# Patient Record
(demographics unavailable — no encounter records)

---

## 2024-12-03 NOTE — HISTORY OF PRESENT ILLNESS
[FreeTextEntry8] : pt presents with left shoulder pain, uncomfortable sleeping, seems to have good range of motion, started 3 weeks ago. Denies muscle weakness.

## 2024-12-03 NOTE — PHYSICAL EXAM
[No Acute Distress] : no acute distress [Grossly Normal Strength/Tone] : grossly normal strength/tone [Normal Gait] : normal gait [Alert and Oriented x3] : oriented to person, place, and time

## 2024-12-03 NOTE — REVIEW OF SYSTEMS
[Joint Pain] : joint pain [Joint Stiffness] : joint stiffness [Back Pain] : no back pain [Joint Swelling] : no joint swelling [Negative] : Heme/Lymph

## 2025-01-23 NOTE — PLAN
[FreeTextEntry1] : Patient presents for URI symptoms for the past 2 days.  Likely viral brochitis Rapid flu negative.  Will do viral panel Advised hydration, rest, nasal sprays, mucinex prn. Will renew inhalers Advised to start medrol steroid taper if chest symptoms worsen

## 2025-01-23 NOTE — HISTORY OF PRESENT ILLNESS
[FreeTextEntry8] : Patient presents sinus pressure, dry cough, PND, chest pressure for 2 days.  Denies fevers, chills, sore throat.  His kids and wife have been sick at home.

## 2025-01-23 NOTE — PHYSICAL EXAM
[No Acute Distress] : no acute distress [Well Nourished] : well nourished [Well Developed] : well developed [Well-Appearing] : well-appearing [Normal Sclera/Conjunctiva] : normal sclera/conjunctiva [Normal Outer Ear/Nose] : the outer ears and nose were normal in appearance [No JVD] : no jugular venous distention [No Respiratory Distress] : no respiratory distress  [No Accessory Muscle Use] : no accessory muscle use [Normal Rate] : normal rate  [Regular Rhythm] : with a regular rhythm [Normal S1, S2] : normal S1 and S2 [No Murmur] : no murmur heard [No Edema] : there was no peripheral edema [Normal Gait] : normal gait [Normal Affect] : the affect was normal [Normal Insight/Judgement] : insight and judgment were intact [de-identified] : mild end exp rhonchi in lower lungs b/l

## 2025-02-03 NOTE — REVIEW OF SYSTEMS
[Discharge] : discharge [Redness] : redness [Nasal Discharge] : nasal discharge [Sore Throat] : sore throat [Negative] : Heme/Lymph [Cough] : no cough

## 2025-02-03 NOTE — HISTORY OF PRESENT ILLNESS
[FreeTextEntry8] : Patient presents for f/u and new symptoms. Was here last week for bronchitis.  Was given an inhaler and nasal sprays.  Still having nasal congestion and drip.  Also has pink eye now for the past day and started taking Moxifloxacin.  Has been having pus discharge from both eyes.   Throat is also hurting him.

## 2025-02-03 NOTE — PHYSICAL EXAM
[No Acute Distress] : no acute distress [Well Nourished] : well nourished [Well Developed] : well developed [Well-Appearing] : well-appearing [Normal Outer Ear/Nose] : the outer ears and nose were normal in appearance [No JVD] : no jugular venous distention [No Respiratory Distress] : no respiratory distress  [No Accessory Muscle Use] : no accessory muscle use [Normal Rate] : normal rate  [Regular Rhythm] : with a regular rhythm [Normal S1, S2] : normal S1 and S2 [No Murmur] : no murmur heard [No Edema] : there was no peripheral edema [Normal Gait] : normal gait [Normal Affect] : the affect was normal [Normal Insight/Judgement] : insight and judgment were intact [de-identified] : erythema and pus discharge b/l.  [de-identified] : mild throat erythema [de-identified] : Mild rhonchi and wheezing b/l

## 2025-05-24 NOTE — PHYSICAL EXAM
[Normal Rate] : the respiratory rate was normal [Normal Rhythm/Effort] : normal respiratory rhythm and effort [Barky Cough] : a barky cough was heard [Rhonchi Right] : rhonchi were heard diffusely over the right lung [Normal] : no carotid or abdominal bruits heard, no varicosities, pedal pulses are present, no peripheral edema, no extremity clubbing or cyanosis and no palpable aorta [de-identified] : frequent cough

## 2025-05-24 NOTE — HISTORY OF PRESENT ILLNESS
[Moderate] : moderate [___ Days ago] : [unfilled] days ago [Constant] : constant [Congestion] : congestion [Cough] : cough [Rest] : rest [At Night] : at night [In Morning] : in the morning [Worsening] : worsening [FreeTextEntry2] : productive, thick green [FreeTextEntry8] : patient is complaining of possible bronchitis, coughing up green mucus, body aches that have gotten better, head congestion X 2 days

## 2025-05-24 NOTE — PLAN
[FreeTextEntry1] : Recommend treatment focusing on decreasing severity of symptoms including salt gargles, over the counter Cepacol lozenges as needed for sore throat, over the counter saline nasal spray as needed for congestion. No antibiotics at this time. Wash hands to prevent the spread of infection, drink plenty of fluids, get appropriate rest, and maintain adequate nutrition. Follow up in 4-5 days if symptoms worsen or if symptoms persists beyond 14 days. Cough my persist for up to 3 weeks after other symptoms resolve. Advised patient to refer to emergency room if temperature 104 or greater, confusion, severe headache, neck pain, stiffness, inability to swallow, difficulty breathing, drooling, chest pain, abdominal pain, vomiting or shortness of breath.

## 2025-06-02 NOTE — HISTORY OF PRESENT ILLNESS
[FreeTextEntry8] : Patient presents for ongoing cough for two weeks. he was given Augmentin initially and cough persisted.  He was then started on Doxy.  CXR negative least week.  He finished augmentin, but is still on Doxy.  Cough is mostly clear but occasionally has green sputum production.  Tried inhalers, cough medicines.   His kids were sick recently, but they recovered after a few days.

## 2025-06-02 NOTE — PLAN
[FreeTextEntry1] : Patient presents for a persistent cough.  Likely has bronchitis.  Low clinical suspicion of PNA.   Unclear if viral vs bacterial. S/p treatment with augmentin C/w Doxycycline 100mg bid until finished.  May start medrol steroid taper. Renewed cough meds. If persistent, may start levaquin after Doxy is finished.  Advised side effects of Levaquin including tendonitis and tendon rupture.  Advised no strenuous exercise while on Levaquin and for 3-4 weeks after course is finished. Will test for Whooping cough via blood work and swab.  Will also do viral panel.

## 2025-06-02 NOTE — PHYSICAL EXAM
[Ill-Appearing] : ill-appearing [Normal Sclera/Conjunctiva] : normal sclera/conjunctiva [Normal Outer Ear/Nose] : the outer ears and nose were normal in appearance [No Respiratory Distress] : no respiratory distress  [No Accessory Muscle Use] : no accessory muscle use [Normal Rate] : normal rate  [Regular Rhythm] : with a regular rhythm [Normal S1, S2] : normal S1 and S2 [No Murmur] : no murmur heard [No Edema] : there was no peripheral edema [Normal Gait] : normal gait [Normal Affect] : the affect was normal [Normal Insight/Judgement] : insight and judgment were intact [de-identified] : faint rhonchi in left lower lungs